# Patient Record
Sex: FEMALE | Race: WHITE | NOT HISPANIC OR LATINO | URBAN - METROPOLITAN AREA
[De-identification: names, ages, dates, MRNs, and addresses within clinical notes are randomized per-mention and may not be internally consistent; named-entity substitution may affect disease eponyms.]

---

## 2017-11-27 ENCOUNTER — EMERGENCY (EMERGENCY)
Facility: HOSPITAL | Age: 57
LOS: 0 days | Discharge: HOME | End: 2017-11-28
Attending: EMERGENCY MEDICINE
Payer: MEDICAID

## 2017-11-27 VITALS
OXYGEN SATURATION: 95 % | SYSTOLIC BLOOD PRESSURE: 164 MMHG | HEART RATE: 79 BPM | RESPIRATION RATE: 17 BRPM | DIASTOLIC BLOOD PRESSURE: 74 MMHG | TEMPERATURE: 99 F | WEIGHT: 270.07 LBS | HEIGHT: 66 IN

## 2017-11-27 DIAGNOSIS — K46.9 UNSPECIFIED ABDOMINAL HERNIA WITHOUT OBSTRUCTION OR GANGRENE: Chronic | ICD-10-CM

## 2017-11-27 DIAGNOSIS — M25.571 PAIN IN RIGHT ANKLE AND JOINTS OF RIGHT FOOT: ICD-10-CM

## 2017-11-27 DIAGNOSIS — E66.01 MORBID (SEVERE) OBESITY DUE TO EXCESS CALORIES: ICD-10-CM

## 2017-11-27 DIAGNOSIS — R60.9 EDEMA, UNSPECIFIED: ICD-10-CM

## 2017-11-27 DIAGNOSIS — E03.9 HYPOTHYROIDISM, UNSPECIFIED: ICD-10-CM

## 2017-11-27 DIAGNOSIS — Z98.891 HISTORY OF UTERINE SCAR FROM PREVIOUS SURGERY: Chronic | ICD-10-CM

## 2017-11-27 DIAGNOSIS — Z90.49 ACQUIRED ABSENCE OF OTHER SPECIFIED PARTS OF DIGESTIVE TRACT: Chronic | ICD-10-CM

## 2017-11-27 DIAGNOSIS — M10.9 GOUT, UNSPECIFIED: ICD-10-CM

## 2017-11-27 DIAGNOSIS — I10 ESSENTIAL (PRIMARY) HYPERTENSION: ICD-10-CM

## 2017-11-27 DIAGNOSIS — E11.9 TYPE 2 DIABETES MELLITUS WITHOUT COMPLICATIONS: ICD-10-CM

## 2017-11-27 LAB
ALBUMIN SERPL ELPH-MCNC: 2.9 G/DL — LOW (ref 3.3–5)
ALP SERPL-CCNC: 175 U/L — HIGH (ref 40–120)
ALT FLD-CCNC: 47 U/L — SIGNIFICANT CHANGE UP (ref 12–78)
ANION GAP SERPL CALC-SCNC: 8 MMOL/L — SIGNIFICANT CHANGE UP (ref 5–17)
APTT BLD: 32.4 SEC — SIGNIFICANT CHANGE UP (ref 27.5–37.4)
AST SERPL-CCNC: 87 U/L — HIGH (ref 15–37)
BASOPHILS # BLD AUTO: 0.1 K/UL — SIGNIFICANT CHANGE UP (ref 0–0.2)
BASOPHILS NFR BLD AUTO: 1.3 % — SIGNIFICANT CHANGE UP (ref 0–2)
BILIRUB SERPL-MCNC: 0.6 MG/DL — SIGNIFICANT CHANGE UP (ref 0.2–1.2)
BUN SERPL-MCNC: 42 MG/DL — HIGH (ref 7–23)
CALCIUM SERPL-MCNC: 8.5 MG/DL — SIGNIFICANT CHANGE UP (ref 8.5–10.1)
CHLORIDE SERPL-SCNC: 100 MMOL/L — SIGNIFICANT CHANGE UP (ref 96–108)
CO2 SERPL-SCNC: 25 MMOL/L — SIGNIFICANT CHANGE UP (ref 22–31)
CREAT SERPL-MCNC: 1.6 MG/DL — HIGH (ref 0.5–1.3)
EOSINOPHIL # BLD AUTO: 0.1 K/UL — SIGNIFICANT CHANGE UP (ref 0–0.5)
EOSINOPHIL NFR BLD AUTO: 1.3 % — SIGNIFICANT CHANGE UP (ref 0–6)
GLUCOSE SERPL-MCNC: 636 MG/DL — CRITICAL HIGH (ref 70–99)
HCT VFR BLD CALC: 36.5 % — SIGNIFICANT CHANGE UP (ref 34.5–45)
HGB BLD-MCNC: 11.8 G/DL — SIGNIFICANT CHANGE UP (ref 11.5–15.5)
INR BLD: 1.14 RATIO — SIGNIFICANT CHANGE UP (ref 0.88–1.16)
LYMPHOCYTES # BLD AUTO: 0.9 K/UL — LOW (ref 1–3.3)
LYMPHOCYTES # BLD AUTO: 21 % — SIGNIFICANT CHANGE UP (ref 13–44)
MCHC RBC-ENTMCNC: 31.1 PG — SIGNIFICANT CHANGE UP (ref 27–34)
MCHC RBC-ENTMCNC: 32.4 GM/DL — SIGNIFICANT CHANGE UP (ref 32–36)
MCV RBC AUTO: 95.8 FL — SIGNIFICANT CHANGE UP (ref 80–100)
MONOCYTES # BLD AUTO: 0.4 K/UL — SIGNIFICANT CHANGE UP (ref 0–0.9)
MONOCYTES NFR BLD AUTO: 8.9 % — SIGNIFICANT CHANGE UP (ref 2–14)
NEUTROPHILS # BLD AUTO: 2.9 K/UL — SIGNIFICANT CHANGE UP (ref 1.8–7.4)
NEUTROPHILS NFR BLD AUTO: 67.6 % — SIGNIFICANT CHANGE UP (ref 43–77)
PLATELET # BLD AUTO: 100 K/UL — LOW (ref 150–400)
POTASSIUM SERPL-MCNC: 4.8 MMOL/L — SIGNIFICANT CHANGE UP (ref 3.5–5.3)
POTASSIUM SERPL-SCNC: 4.8 MMOL/L — SIGNIFICANT CHANGE UP (ref 3.5–5.3)
PROT SERPL-MCNC: 6.9 GM/DL — SIGNIFICANT CHANGE UP (ref 6–8.3)
PROTHROM AB SERPL-ACNC: 12.5 SEC — SIGNIFICANT CHANGE UP (ref 9.8–12.7)
RBC # BLD: 3.81 M/UL — SIGNIFICANT CHANGE UP (ref 3.8–5.2)
RBC # FLD: 14.5 % — SIGNIFICANT CHANGE UP (ref 11–15)
SODIUM SERPL-SCNC: 133 MMOL/L — LOW (ref 135–145)
WBC # BLD: 4.3 K/UL — SIGNIFICANT CHANGE UP (ref 3.8–10.5)
WBC # FLD AUTO: 4.3 K/UL — SIGNIFICANT CHANGE UP (ref 3.8–10.5)

## 2017-11-27 PROCEDURE — 99285 EMERGENCY DEPT VISIT HI MDM: CPT

## 2017-11-27 PROCEDURE — 93971 EXTREMITY STUDY: CPT | Mod: 26,RT

## 2017-11-27 PROCEDURE — 73630 X-RAY EXAM OF FOOT: CPT | Mod: 26,RT

## 2017-11-27 RX ORDER — KETOROLAC TROMETHAMINE 30 MG/ML
30 SYRINGE (ML) INJECTION ONCE
Qty: 0 | Refills: 0 | Status: DISCONTINUED | OUTPATIENT
Start: 2017-11-27 | End: 2017-11-27

## 2017-11-27 RX ORDER — COLCHICINE 0.6 MG
1.2 TABLET ORAL ONCE
Qty: 0 | Refills: 0 | Status: COMPLETED | OUTPATIENT
Start: 2017-11-27 | End: 2017-11-27

## 2017-11-27 RX ORDER — LEVOTHYROXINE SODIUM 125 MCG
1 TABLET ORAL
Qty: 0 | Refills: 0 | COMMUNITY

## 2017-11-27 RX ORDER — SODIUM CHLORIDE 9 MG/ML
2000 INJECTION INTRAMUSCULAR; INTRAVENOUS; SUBCUTANEOUS ONCE
Qty: 0 | Refills: 0 | Status: COMPLETED | OUTPATIENT
Start: 2017-11-27 | End: 2017-11-27

## 2017-11-27 RX ORDER — ACETAMINOPHEN 500 MG
1000 TABLET ORAL ONCE
Qty: 0 | Refills: 0 | Status: COMPLETED | OUTPATIENT
Start: 2017-11-27 | End: 2017-11-27

## 2017-11-27 RX ADMIN — Medication 1.2 MILLIGRAM(S): at 23:26

## 2017-11-27 RX ADMIN — SODIUM CHLORIDE 1000 MILLILITER(S): 9 INJECTION INTRAMUSCULAR; INTRAVENOUS; SUBCUTANEOUS at 23:55

## 2017-11-27 RX ADMIN — Medication 1000 MILLIGRAM(S): at 23:23

## 2017-11-27 RX ADMIN — Medication 400 MILLIGRAM(S): at 23:15

## 2017-11-27 NOTE — ED PROVIDER NOTE - PHYSICAL EXAMINATION
Gen: Alert, c/o pain to R toes  Head: NC, AT, EOMI, normal lids/conjunctiva  ENT: normal hearing, patent oropharynx, MMM  Neck: supple, no tenderness/meningismus, FROM  Pulm: Bilateral clear BS, normal resp effort, no wheeze/stridor/retractions  CV: RRR, no M/R/G, +dist pulses  Abd: soft, NT/ND, +BS, no guarding/rebound tenderness, +obese  Mskel: no edema/erythema/cyanosis, +TTP over R 2nd-5th toes, cap <2sec, DP+2  Skin: no rash  Neuro: AAOx3, no sensory/motor deficits

## 2017-11-27 NOTE — ED PROVIDER NOTE - MEDICAL DECISION MAKING DETAILS
Pt w Pt improved in ED w meds given, pain resolved.  Pt w elevated glucose, no anion gap.  Pt improved w IVF  & insulin, states she would like to go home and use her own insulin for further management.   Discussed results and outcome of testing with the patient, given copy as well.  Patient advised to please follow up with their primary care doctor within the next 24 hours and return to the Emergency Department for worsening symptoms or any other concerns.  Patient advised that their doctor may call  to follow up on the specific results of the tests performed today in the emergency department.

## 2017-11-27 NOTE — ED PROVIDER NOTE - OBJECTIVE STATEMENT
Pertinent PMH/PSH/FHx/SHx and Review of Systems contained within:    58yo F w PMH of HTN, DM, hypothyroidism, gout, water retention presents to ED c/o pain to RLE.  Pt states for about 1mo she noted pain in RLE from knee to calf, then for the last few days noted incr acute pain in her toes.  Pt states she did stub her toes few days prior to onset of pain. Pertinent PMH/PSH/FHx/SHx and Review of Systems contained within:    56yo F w PMH of HTN, DM, hypothyroidism, gout, water retention presents to ED c/o pain to RLE.  Pt states for about 1mo she noted pain in RLE from knee to calf, then for the last few days noted incr acute pain in her toes.  Pt states she did stub her toes few days prior to onset of pain.  Pt also admits she did not take her insulin today due to pain.  Denies fever, chills, CP, SOB, vomiting, diarrhea.    No fever/chills, No photophobia/eye pain/changes in vision, No ear pain/sore throat/dysphagia, No chest pain/palpitations, no SOB/cough/wheeze/stridor, No abdominal pain, No N/V/D, no dysuria/frequency/discharge, No neck/back pain, no rash, no changes in neurological status/function.

## 2017-11-27 NOTE — ED PROVIDER NOTE - PSH
Abdominal hernia  x 3 with abdominal mesh  H/O:   x3  History of appendectomy    History of cholecystectomy

## 2017-11-27 NOTE — ED ADULT NURSE NOTE - OBJECTIVE STATEMENT
Patient reports " toe pain for 1 month, severe tonight. All toes on my right foot. My knee also hurts." + pulses. Denies injury or trauma.

## 2017-11-28 VITALS
SYSTOLIC BLOOD PRESSURE: 148 MMHG | OXYGEN SATURATION: 97 % | RESPIRATION RATE: 17 BRPM | HEART RATE: 64 BPM | DIASTOLIC BLOOD PRESSURE: 72 MMHG | TEMPERATURE: 98 F

## 2017-11-28 LAB
GLUCOSE BLDC GLUCOMTR-MCNC: 506 MG/DL — CRITICAL HIGH (ref 70–99)
GLUCOSE BLDC GLUCOMTR-MCNC: 509 MG/DL — CRITICAL HIGH (ref 70–99)
GLUCOSE BLDC GLUCOMTR-MCNC: 514 MG/DL — CRITICAL HIGH (ref 70–99)
GLUCOSE BLDC GLUCOMTR-MCNC: 517 MG/DL — CRITICAL HIGH (ref 70–99)
URATE SERPL-MCNC: 7.3 MG/DL — HIGH (ref 2.5–7)

## 2017-11-28 RX ORDER — COLCHICINE 0.6 MG
0.6 TABLET ORAL ONCE
Qty: 0 | Refills: 0 | Status: COMPLETED | OUTPATIENT
Start: 2017-11-28 | End: 2017-11-28

## 2017-11-28 RX ORDER — INSULIN HUMAN 100 [IU]/ML
10 INJECTION, SOLUTION SUBCUTANEOUS ONCE
Qty: 0 | Refills: 0 | Status: COMPLETED | OUTPATIENT
Start: 2017-11-28 | End: 2017-11-28

## 2017-11-28 RX ADMIN — Medication 0.6 MILLIGRAM(S): at 01:36

## 2017-11-28 RX ADMIN — INSULIN HUMAN 10 UNIT(S): 100 INJECTION, SOLUTION SUBCUTANEOUS at 01:37

## 2018-04-15 PROBLEM — Z00.00 ENCOUNTER FOR PREVENTIVE HEALTH EXAMINATION: Status: ACTIVE | Noted: 2018-04-15

## 2018-05-15 ENCOUNTER — APPOINTMENT (OUTPATIENT)
Dept: OPHTHALMOLOGY | Facility: CLINIC | Age: 58
End: 2018-05-15

## 2023-12-07 ENCOUNTER — EMERGENCY (EMERGENCY)
Facility: HOSPITAL | Age: 63
LOS: 0 days | Discharge: HOME | End: 2023-12-08
Attending: EMERGENCY MEDICINE
Payer: MEDICAID

## 2023-12-07 VITALS
HEIGHT: 66.5 IN | TEMPERATURE: 98 F | DIASTOLIC BLOOD PRESSURE: 78 MMHG | WEIGHT: 231.93 LBS | HEART RATE: 69 BPM | OXYGEN SATURATION: 99 % | RESPIRATION RATE: 19 BRPM | SYSTOLIC BLOOD PRESSURE: 135 MMHG

## 2023-12-07 DIAGNOSIS — Z90.49 ACQUIRED ABSENCE OF OTHER SPECIFIED PARTS OF DIGESTIVE TRACT: Chronic | ICD-10-CM

## 2023-12-07 DIAGNOSIS — K46.9 UNSPECIFIED ABDOMINAL HERNIA WITHOUT OBSTRUCTION OR GANGRENE: Chronic | ICD-10-CM

## 2023-12-07 DIAGNOSIS — Z98.891 HISTORY OF UTERINE SCAR FROM PREVIOUS SURGERY: Chronic | ICD-10-CM

## 2023-12-07 LAB
ALBUMIN SERPL ELPH-MCNC: 3.4 G/DL — SIGNIFICANT CHANGE UP (ref 3.3–5)
ALBUMIN SERPL ELPH-MCNC: 3.4 G/DL — SIGNIFICANT CHANGE UP (ref 3.3–5)
ALP SERPL-CCNC: 135 U/L — HIGH (ref 40–120)
ALP SERPL-CCNC: 135 U/L — HIGH (ref 40–120)
ALT FLD-CCNC: 36 U/L — SIGNIFICANT CHANGE UP (ref 12–78)
ALT FLD-CCNC: 36 U/L — SIGNIFICANT CHANGE UP (ref 12–78)
ANION GAP SERPL CALC-SCNC: 14 MMOL/L — SIGNIFICANT CHANGE UP (ref 5–17)
ANION GAP SERPL CALC-SCNC: 14 MMOL/L — SIGNIFICANT CHANGE UP (ref 5–17)
AST SERPL-CCNC: 33 U/L — SIGNIFICANT CHANGE UP (ref 15–37)
AST SERPL-CCNC: 33 U/L — SIGNIFICANT CHANGE UP (ref 15–37)
BASOPHILS # BLD AUTO: 0.03 K/UL — SIGNIFICANT CHANGE UP (ref 0–0.2)
BASOPHILS # BLD AUTO: 0.03 K/UL — SIGNIFICANT CHANGE UP (ref 0–0.2)
BASOPHILS NFR BLD AUTO: 0.4 % — SIGNIFICANT CHANGE UP (ref 0–2)
BASOPHILS NFR BLD AUTO: 0.4 % — SIGNIFICANT CHANGE UP (ref 0–2)
BILIRUB SERPL-MCNC: 0.8 MG/DL — SIGNIFICANT CHANGE UP (ref 0.2–1.2)
BILIRUB SERPL-MCNC: 0.8 MG/DL — SIGNIFICANT CHANGE UP (ref 0.2–1.2)
BUN SERPL-MCNC: 141 MG/DL — HIGH (ref 7–23)
BUN SERPL-MCNC: 141 MG/DL — HIGH (ref 7–23)
CALCIUM SERPL-MCNC: 9.8 MG/DL — SIGNIFICANT CHANGE UP (ref 8.5–10.1)
CALCIUM SERPL-MCNC: 9.8 MG/DL — SIGNIFICANT CHANGE UP (ref 8.5–10.1)
CHLORIDE SERPL-SCNC: 94 MMOL/L — LOW (ref 96–108)
CHLORIDE SERPL-SCNC: 94 MMOL/L — LOW (ref 96–108)
CO2 SERPL-SCNC: 27 MMOL/L — SIGNIFICANT CHANGE UP (ref 22–31)
CO2 SERPL-SCNC: 27 MMOL/L — SIGNIFICANT CHANGE UP (ref 22–31)
CREAT SERPL-MCNC: 3.88 MG/DL — HIGH (ref 0.5–1.3)
CREAT SERPL-MCNC: 3.88 MG/DL — HIGH (ref 0.5–1.3)
EGFR: 12 ML/MIN/1.73M2 — LOW
EGFR: 12 ML/MIN/1.73M2 — LOW
EOSINOPHIL # BLD AUTO: 0.12 K/UL — SIGNIFICANT CHANGE UP (ref 0–0.5)
EOSINOPHIL # BLD AUTO: 0.12 K/UL — SIGNIFICANT CHANGE UP (ref 0–0.5)
EOSINOPHIL NFR BLD AUTO: 1.6 % — SIGNIFICANT CHANGE UP (ref 0–6)
EOSINOPHIL NFR BLD AUTO: 1.6 % — SIGNIFICANT CHANGE UP (ref 0–6)
GLUCOSE SERPL-MCNC: 226 MG/DL — HIGH (ref 70–99)
GLUCOSE SERPL-MCNC: 226 MG/DL — HIGH (ref 70–99)
HCT VFR BLD CALC: 29.6 % — LOW (ref 34.5–45)
HCT VFR BLD CALC: 29.6 % — LOW (ref 34.5–45)
HGB BLD-MCNC: 10 G/DL — LOW (ref 11.5–15.5)
HGB BLD-MCNC: 10 G/DL — LOW (ref 11.5–15.5)
IMM GRANULOCYTES NFR BLD AUTO: 0.4 % — SIGNIFICANT CHANGE UP (ref 0–0.9)
IMM GRANULOCYTES NFR BLD AUTO: 0.4 % — SIGNIFICANT CHANGE UP (ref 0–0.9)
LIDOCAIN IGE QN: 112 U/L — HIGH (ref 13–75)
LIDOCAIN IGE QN: 112 U/L — HIGH (ref 13–75)
LYMPHOCYTES # BLD AUTO: 0.81 K/UL — LOW (ref 1–3.3)
LYMPHOCYTES # BLD AUTO: 0.81 K/UL — LOW (ref 1–3.3)
LYMPHOCYTES # BLD AUTO: 11.1 % — LOW (ref 13–44)
LYMPHOCYTES # BLD AUTO: 11.1 % — LOW (ref 13–44)
MAGNESIUM SERPL-MCNC: 3.4 MG/DL — HIGH (ref 1.6–2.6)
MAGNESIUM SERPL-MCNC: 3.4 MG/DL — HIGH (ref 1.6–2.6)
MCHC RBC-ENTMCNC: 30.9 PG — SIGNIFICANT CHANGE UP (ref 27–34)
MCHC RBC-ENTMCNC: 30.9 PG — SIGNIFICANT CHANGE UP (ref 27–34)
MCHC RBC-ENTMCNC: 33.8 G/DL — SIGNIFICANT CHANGE UP (ref 32–36)
MCHC RBC-ENTMCNC: 33.8 G/DL — SIGNIFICANT CHANGE UP (ref 32–36)
MCV RBC AUTO: 91.4 FL — SIGNIFICANT CHANGE UP (ref 80–100)
MCV RBC AUTO: 91.4 FL — SIGNIFICANT CHANGE UP (ref 80–100)
MONOCYTES # BLD AUTO: 0.55 K/UL — SIGNIFICANT CHANGE UP (ref 0–0.9)
MONOCYTES # BLD AUTO: 0.55 K/UL — SIGNIFICANT CHANGE UP (ref 0–0.9)
MONOCYTES NFR BLD AUTO: 7.6 % — SIGNIFICANT CHANGE UP (ref 2–14)
MONOCYTES NFR BLD AUTO: 7.6 % — SIGNIFICANT CHANGE UP (ref 2–14)
NEUTROPHILS # BLD AUTO: 5.74 K/UL — SIGNIFICANT CHANGE UP (ref 1.8–7.4)
NEUTROPHILS # BLD AUTO: 5.74 K/UL — SIGNIFICANT CHANGE UP (ref 1.8–7.4)
NEUTROPHILS NFR BLD AUTO: 78.9 % — HIGH (ref 43–77)
NEUTROPHILS NFR BLD AUTO: 78.9 % — HIGH (ref 43–77)
NRBC # BLD: 0 /100 WBCS — SIGNIFICANT CHANGE UP (ref 0–0)
NRBC # BLD: 0 /100 WBCS — SIGNIFICANT CHANGE UP (ref 0–0)
PHOSPHATE SERPL-MCNC: 6.6 MG/DL — HIGH (ref 2.5–4.5)
PHOSPHATE SERPL-MCNC: 6.6 MG/DL — HIGH (ref 2.5–4.5)
PLATELET # BLD AUTO: 86 K/UL — LOW (ref 150–400)
PLATELET # BLD AUTO: 86 K/UL — LOW (ref 150–400)
POTASSIUM SERPL-MCNC: 3.7 MMOL/L — SIGNIFICANT CHANGE UP (ref 3.5–5.3)
POTASSIUM SERPL-MCNC: 3.7 MMOL/L — SIGNIFICANT CHANGE UP (ref 3.5–5.3)
POTASSIUM SERPL-SCNC: 3.7 MMOL/L — SIGNIFICANT CHANGE UP (ref 3.5–5.3)
POTASSIUM SERPL-SCNC: 3.7 MMOL/L — SIGNIFICANT CHANGE UP (ref 3.5–5.3)
PROT SERPL-MCNC: 7.5 GM/DL — SIGNIFICANT CHANGE UP (ref 6–8.3)
PROT SERPL-MCNC: 7.5 GM/DL — SIGNIFICANT CHANGE UP (ref 6–8.3)
RBC # BLD: 3.24 M/UL — LOW (ref 3.8–5.2)
RBC # BLD: 3.24 M/UL — LOW (ref 3.8–5.2)
RBC # FLD: 15.5 % — HIGH (ref 10.3–14.5)
RBC # FLD: 15.5 % — HIGH (ref 10.3–14.5)
SODIUM SERPL-SCNC: 135 MMOL/L — SIGNIFICANT CHANGE UP (ref 135–145)
SODIUM SERPL-SCNC: 135 MMOL/L — SIGNIFICANT CHANGE UP (ref 135–145)
WBC # BLD: 7.28 K/UL — SIGNIFICANT CHANGE UP (ref 3.8–10.5)
WBC # BLD: 7.28 K/UL — SIGNIFICANT CHANGE UP (ref 3.8–10.5)
WBC # FLD AUTO: 7.28 K/UL — SIGNIFICANT CHANGE UP (ref 3.8–10.5)
WBC # FLD AUTO: 7.28 K/UL — SIGNIFICANT CHANGE UP (ref 3.8–10.5)

## 2023-12-07 PROCEDURE — 99284 EMERGENCY DEPT VISIT MOD MDM: CPT

## 2023-12-07 PROCEDURE — 93010 ELECTROCARDIOGRAM REPORT: CPT

## 2023-12-07 RX ORDER — ALLOPURINOL 300 MG
1 TABLET ORAL
Qty: 0 | Refills: 0 | DISCHARGE

## 2023-12-07 RX ORDER — INSULIN ASPART 100 [IU]/ML
0 INJECTION, SOLUTION SUBCUTANEOUS
Qty: 0 | Refills: 0 | DISCHARGE

## 2023-12-07 RX ORDER — INSULIN GLARGINE 100 [IU]/ML
0 INJECTION, SOLUTION SUBCUTANEOUS
Qty: 0 | Refills: 0 | DISCHARGE

## 2023-12-07 RX ORDER — GABAPENTIN 400 MG/1
1 CAPSULE ORAL
Qty: 0 | Refills: 0 | DISCHARGE

## 2023-12-07 RX ORDER — ISOSORBIDE MONONITRATE 60 MG/1
1 TABLET, EXTENDED RELEASE ORAL
Qty: 0 | Refills: 0 | DISCHARGE

## 2023-12-07 NOTE — ED ADULT NURSE NOTE - NSFALLRISKINTERV_ED_ALL_ED
Assistance OOB with selected safe patient handling equipment if applicable/Assistance with ambulation/Communicate fall risk and risk factors to all staff, patient, and family/Monitor gait and stability/Provide patient with walking aids/Provide visual cue: yellow wristband, yellow gown, etc/Reinforce activity limits and safety measures with patient and family/Call bell, personal items and telephone in reach/Instruct patient to call for assistance before getting out of bed/chair/stretcher/Non-slip footwear applied when patient is off stretcher/[place] to call system/Physically safe environment - no spills, clutter or unnecessary equipment/Purposeful Proactive Rounding/Room/bathroom lighting operational, light cord in reach Assistance OOB with selected safe patient handling equipment if applicable/Assistance with ambulation/Communicate fall risk and risk factors to all staff, patient, and family/Monitor gait and stability/Provide patient with walking aids/Provide visual cue: yellow wristband, yellow gown, etc/Reinforce activity limits and safety measures with patient and family/Call bell, personal items and telephone in reach/Instruct patient to call for assistance before getting out of bed/chair/stretcher/Non-slip footwear applied when patient is off stretcher/[place] to call system/Physically safe environment - no spills, clutter or unnecessary equipment/Purposeful Proactive Rounding/Room/bathroom lighting operational, light cord in reach

## 2023-12-07 NOTE — ED ADULT TRIAGE NOTE - CHIEF COMPLAINT QUOTE
Pt sent in by endocrinologist for low potassium. pt also c/o generalized ABD pain and nausea x 1 week. h/o DM, htn,

## 2023-12-07 NOTE — ED ADULT NURSE NOTE - OBJECTIVE STATEMENT
abd pain for 1 week  ozempic started 1 week ago  nausea but no vomiting  potassium low from kidney doctor  denies chest pain, sob    PMH htn, dm, leukemia Pt presents a&ox4 c/o "My stomach has been hurting on and off for one week. I started ozempic a week ago, I wonder if its related." Also c/o nausea but no vomiting. Denies diarrhea. She also adds that "My doctor had told me that my potassium was a little low." Pt denies chest pain, sob. Respirations are easy, even and unlabored on room air. Skin is warm and dry. PMH htn, dm,

## 2023-12-08 VITALS
DIASTOLIC BLOOD PRESSURE: 76 MMHG | TEMPERATURE: 98 F | OXYGEN SATURATION: 100 % | SYSTOLIC BLOOD PRESSURE: 116 MMHG | RESPIRATION RATE: 18 BRPM

## 2023-12-08 DIAGNOSIS — E11.22 TYPE 2 DIABETES MELLITUS WITH DIABETIC CHRONIC KIDNEY DISEASE: ICD-10-CM

## 2023-12-08 DIAGNOSIS — Z79.4 LONG TERM (CURRENT) USE OF INSULIN: ICD-10-CM

## 2023-12-08 DIAGNOSIS — R63.0 ANOREXIA: ICD-10-CM

## 2023-12-08 DIAGNOSIS — Z90.49 ACQUIRED ABSENCE OF OTHER SPECIFIED PARTS OF DIGESTIVE TRACT: ICD-10-CM

## 2023-12-08 DIAGNOSIS — Z88.0 ALLERGY STATUS TO PENICILLIN: ICD-10-CM

## 2023-12-08 DIAGNOSIS — R11.0 NAUSEA: ICD-10-CM

## 2023-12-08 DIAGNOSIS — R10.9 UNSPECIFIED ABDOMINAL PAIN: ICD-10-CM

## 2023-12-08 DIAGNOSIS — N18.9 CHRONIC KIDNEY DISEASE, UNSPECIFIED: ICD-10-CM

## 2023-12-08 DIAGNOSIS — I12.9 HYPERTENSIVE CHRONIC KIDNEY DISEASE WITH STAGE 1 THROUGH STAGE 4 CHRONIC KIDNEY DISEASE, OR UNSPECIFIED CHRONIC KIDNEY DISEASE: ICD-10-CM

## 2023-12-08 PROBLEM — I10 ESSENTIAL (PRIMARY) HYPERTENSION: Chronic | Status: ACTIVE | Noted: 2017-11-27

## 2023-12-08 PROBLEM — E66.01 MORBID (SEVERE) OBESITY DUE TO EXCESS CALORIES: Chronic | Status: ACTIVE | Noted: 2017-11-27

## 2023-12-08 PROBLEM — E03.9 HYPOTHYROIDISM, UNSPECIFIED: Chronic | Status: ACTIVE | Noted: 2017-11-27

## 2023-12-08 PROBLEM — E11.9 TYPE 2 DIABETES MELLITUS WITHOUT COMPLICATIONS: Chronic | Status: ACTIVE | Noted: 2017-11-27

## 2023-12-08 PROBLEM — R60.9 EDEMA, UNSPECIFIED: Chronic | Status: ACTIVE | Noted: 2017-11-27

## 2023-12-08 NOTE — ED PROVIDER NOTE - PATIENT PORTAL LINK FT
You can access the FollowMyHealth Patient Portal offered by [facility] by registering at the following website: [email or web address]. By joining [employer]’s FollowMyHealth portal, you will also be able to view your health information using other applications (apps) compatible with our system. You can access the FollowMyHealth Patient Portal offered by [facility] by registering at the following website: [email or web address]. By joining [employer]’s FollowMyHealth portal, you will also be able to view your health information using other applications (apps) compatible with our system.

## 2023-12-08 NOTE — ED PROVIDER NOTE - OBJECTIVE STATEMENT
She is status post cholecystectomy and appendectomy. PMH CKD, HTN, DM. Patient with an extensive past medical history presents to the emergency department for 1 week of abdominal pain she states that she started Ozempic and since then has been feeling unwell.  She states intermittent nausea as well as decreased appetite.  She states that she was also called by her kidney doctor and told that her potassium was low and told to come into the emergency department.  She states she does not not make much urine at baseline.  She denies any chest pain or shortness of breath patient she states that her leg swelling is actually better than normal.  Did obtain basic labs.  Patient does have MyChart access and her creatinine is similar to what it was 2 days ago( and last month as well.  Her BUN is also similar.  Her platelet count is always low she states.  Lipase is mildly elevated she states that she is hungry and would like a turkey sandwich.  She has follow-up with her doctors on Monday.  She plans to call her nephrologist tomorrow to go over all results.  Patient is adamant that she does not wish to stay in the hospital and will follow-up as outpatient for all labs.

## 2023-12-08 NOTE — ED PROVIDER NOTE - NSFOLLOWUPINSTRUCTIONS_ED_ALL_ED_FT
You were seen in the emergency department for abdominal pain.  Please keep a bland diet.  Do not take another dose of Ozempic until following up with your endocrinologist.  Please call your doctor tomorrow to go over all of your laboratory findings.  If anything changes such as chest pain, shortness of breath, altered mental status, even further decreased urination or any other concerning signs or symptoms please return to the emergency department immediately.  Please make sure to follow-up with your doctors for your appointments on Monday.

## 2023-12-08 NOTE — ED PROVIDER NOTE - CLINICAL SUMMARY MEDICAL DECISION MAKING FREE TEXT BOX
Pt with abdominal pain after initiating Ozempic will check LFT and lipase. Pt with intermittent nausea none currently. She has had appy and tammy suspicion for other intraabdominal pathology low.     Will check basic labs for any changes and compare to lab work on mychart which she has on her phone.

## 2023-12-08 NOTE — ED PROVIDER NOTE - NSICDXPASTSURGICALHX_GEN_ALL_CORE_FT
PAST SURGICAL HISTORY:  Abdominal hernia x 3 with abdominal mesh    H/O:  x3    History of appendectomy     History of cholecystectomy

## 2023-12-08 NOTE — ED PROVIDER NOTE - NSICDXPASTMEDICALHX_GEN_ALL_CORE_FT
PAST MEDICAL HISTORY:  Diabetes     HTN (hypertension)     Hypothyroid     Morbid obesity     Water retention

## 2023-12-08 NOTE — ED PROVIDER NOTE - PHYSICAL EXAMINATION
General: Well appearing, well nourished, in no distress  Head: Normocephalic, atraumatic  Eyes: Conjunctiva clear, sclera non-icteric  Mouth: Mucous membranes moist, no mucosal lesions.  Neck: Supple  Heart: Regular rate and rhythm, no murmur or gallop  Lungs: Clear to auscultation  Abdomen: Bowel sounds present, soft, no tenderness, non distended, no organomegaly, masses  Back: Spine normal without deformity or tenderness, no CVA tenderness  Extremities: No amputations or deformities, cyanosis, trace LE edema  Musculoskeletal: No crepitation, defects or decreased range of motion, strength intact throughout, pulses intact  Neurologic: No gross deficits normal gait w walker  Psychiatric: Oriented X3, normal mood and affect  Skin: Warm,dry. Good turgor, no rash, unusual bruising

## 2024-03-19 NOTE — ED ADULT NURSE NOTE - ATTEMPT TO OOB
Serum sodium decreased at 131 today  Consider a SIADH type picture in the setting of uncontrolled pain coupled with possible solute loss from decreased oral intake  Trial fluid restriction as paradoxic decrease noted on IV fluids    no

## 2024-07-12 ENCOUNTER — INPATIENT (INPATIENT)
Facility: HOSPITAL | Age: 64
LOS: 1 days | Discharge: HOME | End: 2024-07-14
Attending: HOSPITALIST | Admitting: HOSPITALIST
Payer: MEDICAID

## 2024-07-12 VITALS
RESPIRATION RATE: 18 BRPM | DIASTOLIC BLOOD PRESSURE: 64 MMHG | WEIGHT: 255.07 LBS | TEMPERATURE: 98 F | OXYGEN SATURATION: 100 % | HEIGHT: 66 IN | HEART RATE: 66 BPM | SYSTOLIC BLOOD PRESSURE: 108 MMHG

## 2024-07-12 DIAGNOSIS — Z90.49 ACQUIRED ABSENCE OF OTHER SPECIFIED PARTS OF DIGESTIVE TRACT: Chronic | ICD-10-CM

## 2024-07-12 DIAGNOSIS — Z98.891 HISTORY OF UTERINE SCAR FROM PREVIOUS SURGERY: Chronic | ICD-10-CM

## 2024-07-12 DIAGNOSIS — K46.9 UNSPECIFIED ABDOMINAL HERNIA WITHOUT OBSTRUCTION OR GANGRENE: Chronic | ICD-10-CM

## 2024-07-12 LAB
ALBUMIN SERPL ELPH-MCNC: 2.8 G/DL — LOW (ref 3.3–5)
ALP SERPL-CCNC: 152 U/L — HIGH (ref 40–120)
ALT FLD-CCNC: 26 U/L — SIGNIFICANT CHANGE UP (ref 12–78)
ANION GAP SERPL CALC-SCNC: 5 MMOL/L — SIGNIFICANT CHANGE UP (ref 5–17)
AST SERPL-CCNC: 50 U/L — HIGH (ref 15–37)
BASOPHILS # BLD AUTO: 0.02 K/UL — SIGNIFICANT CHANGE UP (ref 0–0.2)
BASOPHILS NFR BLD AUTO: 0.7 % — SIGNIFICANT CHANGE UP (ref 0–2)
BILIRUB SERPL-MCNC: 1 MG/DL — SIGNIFICANT CHANGE UP (ref 0.2–1.2)
BLD GP AB SCN SERPL QL: SIGNIFICANT CHANGE UP
BUN SERPL-MCNC: 30 MG/DL — HIGH (ref 7–23)
CALCIUM SERPL-MCNC: 9.7 MG/DL — SIGNIFICANT CHANGE UP (ref 8.5–10.1)
CHLORIDE SERPL-SCNC: 107 MMOL/L — SIGNIFICANT CHANGE UP (ref 96–108)
CO2 SERPL-SCNC: 28 MMOL/L — SIGNIFICANT CHANGE UP (ref 22–31)
CREAT SERPL-MCNC: 3.2 MG/DL — HIGH (ref 0.5–1.3)
EGFR: 15 ML/MIN/1.73M2 — LOW
EOSINOPHIL # BLD AUTO: 0.09 K/UL — SIGNIFICANT CHANGE UP (ref 0–0.5)
EOSINOPHIL NFR BLD AUTO: 3 % — SIGNIFICANT CHANGE UP (ref 0–6)
GLUCOSE SERPL-MCNC: 237 MG/DL — HIGH (ref 70–99)
HCT VFR BLD CALC: 30.7 % — LOW (ref 34.5–45)
HGB BLD-MCNC: 10.1 G/DL — LOW (ref 11.5–15.5)
IMM GRANULOCYTES NFR BLD AUTO: 0.3 % — SIGNIFICANT CHANGE UP (ref 0–0.9)
LIDOCAIN IGE QN: 58 U/L — SIGNIFICANT CHANGE UP (ref 13–75)
LYMPHOCYTES # BLD AUTO: 0.68 K/UL — LOW (ref 1–3.3)
LYMPHOCYTES # BLD AUTO: 22.4 % — SIGNIFICANT CHANGE UP (ref 13–44)
MCHC RBC-ENTMCNC: 32.9 G/DL — SIGNIFICANT CHANGE UP (ref 32–36)
MCHC RBC-ENTMCNC: 34.1 PG — HIGH (ref 27–34)
MCV RBC AUTO: 103.7 FL — HIGH (ref 80–100)
MONOCYTES # BLD AUTO: 0.32 K/UL — SIGNIFICANT CHANGE UP (ref 0–0.9)
MONOCYTES NFR BLD AUTO: 10.5 % — SIGNIFICANT CHANGE UP (ref 2–14)
NEUTROPHILS # BLD AUTO: 1.92 K/UL — SIGNIFICANT CHANGE UP (ref 1.8–7.4)
NEUTROPHILS NFR BLD AUTO: 63.1 % — SIGNIFICANT CHANGE UP (ref 43–77)
NRBC # BLD: 0 /100 WBCS — SIGNIFICANT CHANGE UP (ref 0–0)
PLATELET # BLD AUTO: 42 K/UL — LOW (ref 150–400)
POTASSIUM SERPL-MCNC: 4.3 MMOL/L — SIGNIFICANT CHANGE UP (ref 3.5–5.3)
POTASSIUM SERPL-SCNC: 4.3 MMOL/L — SIGNIFICANT CHANGE UP (ref 3.5–5.3)
PROT SERPL-MCNC: 6.4 GM/DL — SIGNIFICANT CHANGE UP (ref 6–8.3)
RBC # BLD: 2.96 M/UL — LOW (ref 3.8–5.2)
RBC # FLD: 14.6 % — HIGH (ref 10.3–14.5)
SODIUM SERPL-SCNC: 140 MMOL/L — SIGNIFICANT CHANGE UP (ref 135–145)
WBC # BLD: 3.04 K/UL — LOW (ref 3.8–10.5)
WBC # FLD AUTO: 3.04 K/UL — LOW (ref 3.8–10.5)

## 2024-07-12 PROCEDURE — 99285 EMERGENCY DEPT VISIT HI MDM: CPT

## 2024-07-12 PROCEDURE — 74177 CT ABD & PELVIS W/CONTRAST: CPT | Mod: 26,MC

## 2024-07-12 PROCEDURE — 99223 1ST HOSP IP/OBS HIGH 75: CPT

## 2024-07-12 PROCEDURE — 71275 CT ANGIOGRAPHY CHEST: CPT | Mod: 26,MC

## 2024-07-12 PROCEDURE — 93010 ELECTROCARDIOGRAM REPORT: CPT

## 2024-07-12 RX ORDER — ACETAMINOPHEN 325 MG
650 TABLET ORAL EVERY 6 HOURS
Refills: 0 | Status: DISCONTINUED | OUTPATIENT
Start: 2024-07-12 | End: 2024-07-14

## 2024-07-12 RX ORDER — ONDANSETRON HYDROCHLORIDE 2 MG/ML
4 INJECTION INTRAMUSCULAR; INTRAVENOUS EVERY 8 HOURS
Refills: 0 | Status: DISCONTINUED | OUTPATIENT
Start: 2024-07-12 | End: 2024-07-14

## 2024-07-12 RX ORDER — DEXTROSE 30 % IN WATER 30 %
15 VIAL (ML) INTRAVENOUS ONCE
Refills: 0 | Status: DISCONTINUED | OUTPATIENT
Start: 2024-07-12 | End: 2024-07-14

## 2024-07-12 RX ORDER — MAGNESIUM, ALUMINUM HYDROXIDE 400-400
30 TABLET,CHEWABLE ORAL EVERY 4 HOURS
Refills: 0 | Status: DISCONTINUED | OUTPATIENT
Start: 2024-07-12 | End: 2024-07-14

## 2024-07-12 RX ORDER — DEXTROSE MONOHYDRATE AND SODIUM CHLORIDE 5; .3 G/100ML; G/100ML
1000 INJECTION, SOLUTION INTRAVENOUS
Refills: 0 | Status: DISCONTINUED | OUTPATIENT
Start: 2024-07-12 | End: 2024-07-14

## 2024-07-12 RX ORDER — PANTOPRAZOLE SODIUM 40 MG/10ML
80 INJECTION, POWDER, FOR SOLUTION INTRAVENOUS ONCE
Refills: 0 | Status: DISCONTINUED | OUTPATIENT
Start: 2024-07-12 | End: 2024-07-13

## 2024-07-12 RX ORDER — GLUCAGON HYDROCHLORIDE 1 MG/ML
1 INJECTION, POWDER, FOR SOLUTION INTRAMUSCULAR; INTRAVENOUS; SUBCUTANEOUS ONCE
Refills: 0 | Status: DISCONTINUED | OUTPATIENT
Start: 2024-07-12 | End: 2024-07-14

## 2024-07-12 RX ORDER — INSULIN LISPRO 100 [IU]/ML
INJECTION, SOLUTION SUBCUTANEOUS
Refills: 0 | Status: DISCONTINUED | OUTPATIENT
Start: 2024-07-12 | End: 2024-07-14

## 2024-07-12 RX ORDER — DEXTROSE 30 % IN WATER 30 %
25 VIAL (ML) INTRAVENOUS ONCE
Refills: 0 | Status: DISCONTINUED | OUTPATIENT
Start: 2024-07-12 | End: 2024-07-14

## 2024-07-12 NOTE — ED PROVIDER NOTE - OBJECTIVE STATEMENT
Jacqueline PETERSON: 63-year-old female, history of end-stage renal disease gets dialysis Tuesday Thursday Saturday, last had on Thursday, history of possible esophageal perforation in the past cirrhosis leukemia diabetes presents with a chief complaint of 3 days of coughing up blood, she notes mild diffuse abdominal pain no changes in stools, no urinary complaints, no prior history of DVT or PE, notes when she wakes morning she sees blood in the back of her throat and coughs it up, does not have any chest pain or trouble breathing, no nausea vomiting no fever.

## 2024-07-12 NOTE — ED ADULT NURSE NOTE - DRUG PRE-SCREENING (DAST -1)
Statement Selected Intermediate Repair Preamble Text (Leave Blank If You Do Not Want): Undermining was performed with blunt dissection.

## 2024-07-12 NOTE — ED PROVIDER NOTE - PHYSICAL EXAMINATION
Jacqueline PETERSON:  VITALS: Initial triage and subsequent vitals have been reviewed by me.  GEN APPEARANCE: Alert, cooperative. Non-toxic appearing. Well appearing. NAD.  HEAD: Atraumatic, normocephalic   EYES: PERRLa, EOMI, vision grossly intact.   EARS: Gross hearing intact.   NOSE: No nasal discharge, no external evidence of epistaxis.   THROAT: MMM. Oral cavity and pharynx normal. No inflammation, no swelling, no exudate, no oral lesions.  NECK: Supple  CV: RRR, S1S2, no c/r/m/g. No cyanosis. Extremities warm, well perfused. Cap refill <2 seconds. No bruits.   LUNGS: CTAB. No wheezing. No rales. No rhonchi. No diminished breath sounds.   ABDOMEN: Soft, NTND. No guarding or rebound. No masses.   MSK/EXT: Spine appears normal, no spine point tenderness. No CVA ttp. Normal muscular development. Pelvis stable. No obvious joint or bony deformity, no peripheral edema.   NEURO: Alert, follows commands. Weight bearing normal. Speech normal. Sensation and motor normal x4 extremities.   SKIN: Normal color for race, warm, dry and intact. No evidence of rash.R chest wall Shiley in place   PSYCH: Normal mood and affect.

## 2024-07-12 NOTE — ED ADULT NURSE NOTE - OBJECTIVE STATEMENT
63 yr old female with PMH of hypertension, diabetes, asthma, high cholesterol, CHF, kidney failure on dialysis on T/Th/Sat. Pt comes in with c/o abdominal and throat pain starting last night. Pt states bloody sputum. Pt reports two episodes of diarrhea overnight. Pt denies cough, dizziness. SOB, chest pain, blurred vision, N/V.

## 2024-07-12 NOTE — ED PROVIDER NOTE - CLINICAL SUMMARY MEDICAL DECISION MAKING FREE TEXT BOX
Jacqueline PETERSON: Exam vitals are stable nontoxic-appearing with physical exam as above DDx unclear etiology of patient's coughing up blood, could be possibly secondary to dental issue though as patient notices that when she brushes her teeth when she wakes up in the morning, unclear from history but would be possible sleep secondary to PE though no prior history, patient also notes she is having diffuse bruising everywhere consider possible complicated by possible worsening cirrhosis thrombocytopenia, platelets/clotting disorder?  Less concern for GI bleed but would not eliminate from differential, given overall presentation we will check basic labs CT chest abdomen pelvis EKG type and screen, give meds as needed and reassess, dispo pending patient may require admission for further evaluation pending study results.

## 2024-07-12 NOTE — ED ADULT TRIAGE NOTE - CHIEF COMPLAINT QUOTE
spitting up blood with clots for the past 3 days. Intermittent lower abdominal pains, no pains at triage.  hx torn esophagus 13 years ago, ESRD on dialysis 3 x weekly.

## 2024-07-12 NOTE — ED PROVIDER NOTE - DATE/TIME 1
[phone]-patient called, wondering about coverage after the PA was done.  He is calling [employer] to ask if they have made their decision yet. Patient offered to do this and was satisfied with handling it himself  
Adderall XR was denied.  I am faxing the paperwork to RUFINO Frankel.    
Call to [employer] and spoke to representative reviewed case  Approval granted   Reference [id number]  Approved from 2/22/19 to 2/22/20    Writer called and left message for patient that medication has been approved amphetamine-dextroamphetamine (ADDERALL XR) 25 MG 24 hr capsule Take 1 capsule by mouth daily  Routed to provider as a FYI only no med change needed   
Date:  2/15/2019    Medication:  Adderall XR 25 mg capsules    Message to Prescriber:  I faxed PA form to Jostin [employer]YALA Company Name:  [employer] [facility]    PA Company Phone/Fax Number:  /    Pharmacy Name:  [facility]    Pharmacy Phone/Fax Number:  [phone]    Prior Auth (PA) form requested from insurance:  Yes  [x]    No  []     Demographics complete on Prior Auth (PA).  Yes  [x]    No  []     Prior Auth (PA) form placed in provider's (MD/NP) mailbox:  Yes  []    No  [x]     Date Prior Auth (PA) form placed in provider's (MD/NP) mailbox:      Date Prior Auth (PA) faxed to insurance company/pharmacy:          
[employer] PA form completed for amphetamine-dextroamphetamine (ADDERALL XR) 25 MG 24 hr capsule Take 1 capsule by mouth daily. Completed with covermymeds reference [id number]     Diagnosis:  f90.0 ADHD  How long on medication: 1/31/18--although been on the med since 8/2017  Other PDL medications tried:  adderall 10 mg 1/31/18 to 5/22/18, adderall 15 mg 10/14/17 to 11/3/17, dextroamphetamine 15 mg 10/17/18 to 1/4/18 all ineffective   
Patient left an angry voice mail.  Has been out of med for 3 days.  Please address asap  
Per fax received med not covered by plan. amphetamine-dextroamphetamine (ADDERALL XR) 25 MG 24 hr capsule Take 1 capsule by mouth daily    Message routed to provider to review/address  
12-Jul-2024 22:22

## 2024-07-12 NOTE — ED PROVIDER NOTE - PROGRESS NOTE DETAILS
Jacqueline PETERSON:  Jacqueline PETERSON: I discussed case with the hospitalist, see accepting physician. Will arrange for admission to their service.

## 2024-07-12 NOTE — ED ADULT NURSE NOTE - NSFALLHARMRISKINTERV_ED_ALL_ED

## 2024-07-13 ENCOUNTER — TRANSCRIPTION ENCOUNTER (OUTPATIENT)
Age: 64
End: 2024-07-13

## 2024-07-13 DIAGNOSIS — D69.6 THROMBOCYTOPENIA, UNSPECIFIED: ICD-10-CM

## 2024-07-13 DIAGNOSIS — I85.00 ESOPHAGEAL VARICES WITHOUT BLEEDING: ICD-10-CM

## 2024-07-13 DIAGNOSIS — K74.60 UNSPECIFIED CIRRHOSIS OF LIVER: ICD-10-CM

## 2024-07-13 DIAGNOSIS — R21 RASH AND OTHER NONSPECIFIC SKIN ERUPTION: ICD-10-CM

## 2024-07-13 DIAGNOSIS — N18.6 END STAGE RENAL DISEASE: ICD-10-CM

## 2024-07-13 DIAGNOSIS — E11.9 TYPE 2 DIABETES MELLITUS WITHOUT COMPLICATIONS: ICD-10-CM

## 2024-07-13 LAB
A1C WITH ESTIMATED AVERAGE GLUCOSE RESULT: 8.5 % — HIGH (ref 4–5.6)
ALBUMIN SERPL ELPH-MCNC: 2.7 G/DL — LOW (ref 3.3–5)
ALP SERPL-CCNC: 137 U/L — HIGH (ref 40–120)
ALT FLD-CCNC: 21 U/L — SIGNIFICANT CHANGE UP (ref 12–78)
ANION GAP SERPL CALC-SCNC: 8 MMOL/L — SIGNIFICANT CHANGE UP (ref 5–17)
AST SERPL-CCNC: 27 U/L — SIGNIFICANT CHANGE UP (ref 15–37)
BILIRUB SERPL-MCNC: 1 MG/DL — SIGNIFICANT CHANGE UP (ref 0.2–1.2)
BUN SERPL-MCNC: 34 MG/DL — HIGH (ref 7–23)
CALCIUM SERPL-MCNC: 9.8 MG/DL — SIGNIFICANT CHANGE UP (ref 8.5–10.1)
CHLORIDE SERPL-SCNC: 107 MMOL/L — SIGNIFICANT CHANGE UP (ref 96–108)
CHOLEST SERPL-MCNC: 95 MG/DL — SIGNIFICANT CHANGE UP
CO2 SERPL-SCNC: 26 MMOL/L — SIGNIFICANT CHANGE UP (ref 22–31)
CREAT SERPL-MCNC: 3.47 MG/DL — HIGH (ref 0.5–1.3)
EGFR: 14 ML/MIN/1.73M2 — LOW
ESTIMATED AVERAGE GLUCOSE: 197 MG/DL — HIGH (ref 68–114)
GLUCOSE BLDC GLUCOMTR-MCNC: 181 MG/DL — HIGH (ref 70–99)
GLUCOSE BLDC GLUCOMTR-MCNC: 206 MG/DL — HIGH (ref 70–99)
GLUCOSE BLDC GLUCOMTR-MCNC: 247 MG/DL — HIGH (ref 70–99)
GLUCOSE SERPL-MCNC: 194 MG/DL — HIGH (ref 70–99)
HCT VFR BLD CALC: 28.9 % — LOW (ref 34.5–45)
HDLC SERPL-MCNC: 43 MG/DL — LOW
HGB BLD-MCNC: 9.5 G/DL — LOW (ref 11.5–15.5)
LIPID PNL WITH DIRECT LDL SERPL: 34 MG/DL — SIGNIFICANT CHANGE UP
MCHC RBC-ENTMCNC: 32.9 G/DL — SIGNIFICANT CHANGE UP (ref 32–36)
MCHC RBC-ENTMCNC: 34.1 PG — HIGH (ref 27–34)
MCV RBC AUTO: 103.6 FL — HIGH (ref 80–100)
NON HDL CHOLESTEROL: 52 MG/DL — SIGNIFICANT CHANGE UP
NRBC # BLD: 0 /100 WBCS — SIGNIFICANT CHANGE UP (ref 0–0)
PLATELET # BLD AUTO: 37 K/UL — LOW (ref 150–400)
POTASSIUM SERPL-MCNC: 3.9 MMOL/L — SIGNIFICANT CHANGE UP (ref 3.5–5.3)
POTASSIUM SERPL-SCNC: 3.9 MMOL/L — SIGNIFICANT CHANGE UP (ref 3.5–5.3)
PROT SERPL-MCNC: 5.7 GM/DL — LOW (ref 6–8.3)
RBC # BLD: 2.79 M/UL — LOW (ref 3.8–5.2)
RBC # FLD: 14.7 % — HIGH (ref 10.3–14.5)
SODIUM SERPL-SCNC: 141 MMOL/L — SIGNIFICANT CHANGE UP (ref 135–145)
TRIGL SERPL-MCNC: 94 MG/DL — SIGNIFICANT CHANGE UP
WBC # BLD: 2.89 K/UL — LOW (ref 3.8–10.5)
WBC # FLD AUTO: 2.89 K/UL — LOW (ref 3.8–10.5)

## 2024-07-13 PROCEDURE — 99233 SBSQ HOSP IP/OBS HIGH 50: CPT

## 2024-07-13 RX ORDER — DIPHENHYDRAMINE HCL 12.5MG/5ML
25 ELIXIR ORAL EVERY 8 HOURS
Refills: 0 | Status: DISCONTINUED | OUTPATIENT
Start: 2024-07-13 | End: 2024-07-14

## 2024-07-13 RX ORDER — DIPHENHYDRAMINE HCL 12.5MG/5ML
25 ELIXIR ORAL ONCE
Refills: 0 | Status: COMPLETED | OUTPATIENT
Start: 2024-07-13 | End: 2024-07-13

## 2024-07-13 RX ORDER — RIFAXIMIN 550 MG/1
1 TABLET ORAL
Qty: 0 | Refills: 0 | DISCHARGE
Start: 2024-07-13

## 2024-07-13 RX ORDER — BACITRACIN 500 UNIT/G
1 OINTMENT (GRAM) TOPICAL
Refills: 0 | Status: DISCONTINUED | OUTPATIENT
Start: 2024-07-13 | End: 2024-07-14

## 2024-07-13 RX ORDER — BETAMETHASONE DIPROPIONATE 0.5 MG/G
1 CREAM TOPICAL
Refills: 0 | Status: DISCONTINUED | OUTPATIENT
Start: 2024-07-13 | End: 2024-07-14

## 2024-07-13 RX ORDER — OXYCODONE HYDROCHLORIDE 100 MG/5ML
5 SOLUTION ORAL ONCE
Refills: 0 | Status: DISCONTINUED | OUTPATIENT
Start: 2024-07-13 | End: 2024-07-13

## 2024-07-13 RX ORDER — DIPHENHYDRAMINE HYDROCHLORIDE AND ZINC ACETATE 10; 1 MG/G; MG/G
1 CREAM TOPICAL ONCE
Refills: 0 | Status: COMPLETED | OUTPATIENT
Start: 2024-07-13 | End: 2024-07-14

## 2024-07-13 RX ORDER — PANTOPRAZOLE SODIUM 40 MG/10ML
40 INJECTION, POWDER, FOR SOLUTION INTRAVENOUS EVERY 12 HOURS
Refills: 0 | Status: DISCONTINUED | OUTPATIENT
Start: 2024-07-13 | End: 2024-07-14

## 2024-07-13 RX ADMIN — OXYCODONE HYDROCHLORIDE 5 MILLIGRAM(S): 100 SOLUTION ORAL at 13:55

## 2024-07-13 RX ADMIN — INSULIN LISPRO 4: 100 INJECTION, SOLUTION SUBCUTANEOUS at 08:02

## 2024-07-13 RX ADMIN — INSULIN LISPRO 4: 100 INJECTION, SOLUTION SUBCUTANEOUS at 12:04

## 2024-07-13 RX ADMIN — Medication 25 MILLIGRAM(S): at 00:56

## 2024-07-13 RX ADMIN — Medication 25 MILLIGRAM(S): at 13:51

## 2024-07-13 RX ADMIN — Medication 25 MILLIGRAM(S): at 19:15

## 2024-07-13 RX ADMIN — Medication 3 MILLIGRAM(S): at 00:56

## 2024-07-13 RX ADMIN — PANTOPRAZOLE SODIUM 40 MILLIGRAM(S): 40 INJECTION, POWDER, FOR SOLUTION INTRAVENOUS at 05:29

## 2024-07-13 RX ADMIN — PANTOPRAZOLE SODIUM 40 MILLIGRAM(S): 40 INJECTION, POWDER, FOR SOLUTION INTRAVENOUS at 00:56

## 2024-07-13 NOTE — DISCHARGE NOTE PROVIDER - NSDCMRMEDTOKEN_GEN_ALL_CORE_FT
gabapentin 100 mg oral capsule: 1 cap(s) orally 3 times a day  Imdur 30 mg oral tablet, extended release: 1 tab(s) orally once a day (in the morning)  Lantus Solostar Pen 100 units/mL subcutaneous solution: unit(s) subcutaneous once a day (at bedtime)  NovoLOG FlexPen 100 units/mL subcutaneous solution: unit(s) subcutaneous 3 times a day  rifAXIMin 550 mg oral tablet: 1 tab(s) orally 2 times a day  Synthroid 75 mcg (0.075 mg) oral tablet: 1 tab(s) orally once a day

## 2024-07-13 NOTE — CONSULT NOTE ADULT - SUBJECTIVE AND OBJECTIVE BOX
Patient chart reviewed, full consult to follow.     Will arrange for HD today    Thank you for the courtesy of this consultation.   [facility] NEPHROLOGY SERVICES, [facility]  NEPHROLOGY AND HYPERTENSION  [address]  [address]  [place] [address]  [phone]    MD MILTON LAURENT MD YELENA ROSENBERG, MD BINNY KOSHY, MD CHRISTOPHER CAPUTO, MD EDWARD BOVER, MD      Information from chart:  "Patient is a 63y old  Female who presents with a chief complaint of Bleeding in the Mouth with Thrombocytopenia (2024 13:23)    HPI:  63 year old male with a PMH of ESRD(HD-T,Th,S), DM, esophageal perforation (13 years ago), Cirrhosis, Leukemia presents to the ED to be evaluated for “coughing up blood” as per ED provider. Upon evaluation, patient endorses, she has not been coughing blood, nor vomiting blood, but for the past 3 days she has been finding blood on her pillow, & dry blood around her mouth when she wakes up every morning. Upon inspection of her mouth, patient appears to have a injury on the hard palate, patient also has thrombocytopenia with a platelet of 42 which could be contributory to the blood finding in her pillow upon waken up. Denies any bleeding when she is awake. Endorse mild throat discomfort, abdominal discomfort, and increase in fluid which she needed to have an extra dialysis session for this week. Denies SOB, cough, chest pain, palpitation, diarrhea, nausea, vomiting or any other symptoms. Labs remarkable for H/H:10.1/30.7, MCV: 103.7, Platelet 42, BUN/Cr:30/3.2, Alkp/AST: 152/50. Vitals stable. Afebrile.   CTA-Chest: No pulmonary embolism. Small right pleural effusion with associated atelectasis.  CT-Abd/P: Hepatomegaly and nodular contour to the liver consistent with cirrhosis. Splenomegaly. Prominent esophageal varices and alonso splenic varices consistent with portal hypertension.   (2024 06:02)   "    PAST MEDICAL & SURGICAL HISTORY:  HTN (hypertension)      Diabetes      Water retention      Hypothyroid      Morbid obesity      History of cholecystectomy      History of appendectomy      H/O:   x3      Abdominal hernia  x 3 with abdominal mesh        FAMILY HISTORY:    Allergies    penicillin (Hives)    Intolerances    NSAIDs (Other; Headache; Nausea)    Home Medications:  gabapentin 100 mg oral capsule: 1 cap(s) orally 3 times a day (07 Dec 2023 21:16)  Imdur 30 mg oral tablet, extended release: 1 tab(s) orally once a day (in the morning) (07 Dec 2023 21:16)  Lantus Solostar Pen 100 units/mL subcutaneous solution: unit(s) subcutaneous once a day (at bedtime) (07 Dec 2023 21:16)  NovoLOG FlexPen 100 units/mL subcutaneous solution: unit(s) subcutaneous 3 times a day (07 Dec 2023 21:16)  rifAXIMin 550 mg oral tablet: 1 tab(s) orally 2 times a day (2024 13:32)  Synthroid 75 mcg (0.075 mg) oral tablet: 1 tab(s) orally once a day (2017 23:19)    MEDICATIONS  (STANDING):  bacitracin   Ointment 1 Application(s) Topical two times a day  betamethasone valerate 0.1% Cream 1 Application(s) Topical two times a day  dextrose 5%. 1000 milliLiter(s) (50 mL/Hr) IV Continuous <Continuous>  dextrose 50% Injectable 25 Gram(s) IV Push once  glucagon  Injectable 1 milliGRAM(s) IntraMuscular once  insulin lispro (ADMELOG) corrective regimen sliding scale   SubCutaneous three times a day before meals  pantoprazole    Tablet 40 milliGRAM(s) Oral every 12 hours  rifAXIMin 550 milliGRAM(s) Oral two times a day    MEDICATIONS  (PRN):  acetaminophen     Tablet .. 650 milliGRAM(s) Oral every 6 hours PRN Temp greater or equal to 38C (100.4F), Mild Pain (1 - 3)  aluminum hydroxide/magnesium hydroxide/simethicone Suspension 30 milliLiter(s) Oral every 4 hours PRN Dyspepsia  dextrose Oral Gel 15 Gram(s) Oral once PRN Blood Glucose LESS THAN 70 milliGRAM(s)/deciliter  diphenhydrAMINE 25 milliGRAM(s) Oral every 8 hours PRN Rash and/or Itching  melatonin 3 milliGRAM(s) Oral at bedtime PRN Insomnia  ondansetron Injectable 4 milliGRAM(s) IV Push every 8 hours PRN Nausea and/or Vomiting    Vital Signs Last 24 Hrs  T(C): 36.7 (2024 11:02), Max: 36.7 (2024 23:48)  T(F): 98 (2024 11:02), Max: 98 (2024 23:48)  HR: 58 (2024 11:02) (58 - 66)  BP: 152/67 (2024 11:02) (107/77 - 164/77)  BP(mean): --  RR: 18 (2024 11:02) (18 - 20)  SpO2: 94% (:) (94% - 99%)    Parameters below as of 2024 11:  Patient On (Oxygen Delivery Method): room air        Daily Height in cm: 162.56 (2024 00:27)    Daily     CAPILLARY BLOOD GLUCOSE      POCT Blood Glucose.: 247 mg/dL (2024 11:40)  POCT Blood Glucose.: 206 mg/dL (2024 07:53)    PHYSICAL EXAM:      T(C): 36.7 (24 @ 11:02), Max: 36.7 (24 @ 23:48)  HR: 58 (24 @ 11:02) (58 - 66)  BP: 152/67 (24 @ 11:02) (107/77 - 164/77)  RR: 18 (24 @ 11:02) (18 - 20)  SpO2: 94% (24 @ 11:02) (94% - 99%)  Wt(kg): --  Lungs clear  Heart S1S2  Abd soft NT ND  Extremities:   tr edema                  141  |  107  |  34<H>  ----------------------------<  194<H>  3.9   |  26  |  3.47<H>    Ca    9.8      2024 08:00    TPro  5.7<L>  /  Alb  2.7<L>  /  TBili  1.0  /  DBili  x   /  AST  27  /  ALT  21  /  AlkPhos  137<H>                            9.5    2.89  )-----------( 37       ( 2024 08:00 )             28.9     Creatinine Trend: 3.47<--, 3.20<--  Urinalysis Basic - ( 2024 08:00 )    Color: x / Appearance: x / SG: x / pH: x  Gluc: 194 mg/dL / Ketone: x  / Bili: x / Urobili: x   Blood: x / Protein: x / Nitrite: x   Leuk Esterase: x / RBC: x / WBC x   Sq Epi: x / Non Sq Epi: x / Bacteria: x            Assessment   ESRD, maintenance  Thrombocytopenia with suspected oral source of bleeding     Plan  HD for today  UF as tolerated   Discharge planning     Filippo Smith MD

## 2024-07-13 NOTE — H&P ADULT - PROBLEM SELECTOR PLAN 1
History of Leukemia with a Platelet 42   3 day history of waking up with blood on pillow.  Multiple bruises & petechia throughout the body  - Monitor for bleeding   - Consider platelet transfusion if bleeding continues History of Leukemia with a Platelet 42   3 day history of waking up with blood on pillow.  Multiple bruises & petechia throughout the body  - Monitor for bleeding   - Consider platelet transfusion if bleeding continues  -

## 2024-07-13 NOTE — H&P ADULT - ASSESSMENT
63 year old male with a PMH of ESRD(HD-T,Th,S), DM, esophageal perforation (13 years ago), Cirrhosis, Leukemia presents to the ED to be evaluated for “coughing up blood” as per ED provider. Upon evaluation, patient endorses, she has not been coughing blood, nor vomiting blood, but for the past 3 days she has been finding blood on her pillow, & dry blood around her mouth when she wakes up every morning. Upon inspection of her mouth, patient appears to have a injury on the hard palate, patient also has thrombocytopenia with a platelet of 42 which could be contributory to the blood finding in her pillow upon waken up. Denies any bleeding when she is awake. Endorse mild throat discomfort, abdominal discomfort, and increase in fluid which she needed to have an extra dialysis session for this week. Denies SOB, cough, chest pain, palpitation, diarrhea, nausea, vomiting or any other symptoms. Labs remarkable for H/H:10.1/30.7, MCV: 103.7, Platelet 42, BUN/Cr:30/3.2, Alkp/AST: 152/50. Vitals stable. Afebrile.   CTA-Chest: No pulmonary embolism. Small right pleural effusion with associated atelectasis.     63 year old male with a PMH of ESRD(HD-T,Th,S), DM, esophageal perforation (13 years ago), Cirrhosis, Leukemia presents to the ED to be evaluated for “coughing up blood” as per ED provider. Upon evaluation, patient endorses, she has not been coughing blood, nor vomiting blood, but for the past 3 days she has been finding blood on her pillow, & dry blood around her mouth when she wakes up every morning. Upon inspection of her mouth, patient appears to have a injury on the hard palate, patient also has thrombocytopenia with a platelet of 42 which could be contributory to the blood finding in her pillow upon waken up. Denies any bleeding when she is awake. Endorse mild throat discomfort, abdominal discomfort, and increase in fluid which she needed to have an extra dialysis session for this week. Denies SOB, cough, chest pain, palpitation, diarrhea, nausea, vomiting or any other symptoms. Labs remarkable for H/H:10.1/30.7, MCV: 103.7, Platelet 42, BUN/Cr:30/3.2, Alkp/AST: 152/50. Vitals stable. Afebrile.

## 2024-07-13 NOTE — H&P ADULT - HISTORY OF PRESENT ILLNESS
63 year old male with a PMH of ESRD(HD-T,Th,S), DM, esophageal perforation (13 years ago), Cirrhosis, Leukemia presents to the ED to be evaluated for “coughing up blood” as per ED provider. Upon evaluation, patient endorses, she has not been coughing blood, nor vomiting blood, but for the past 3 days she has been finding blood on her pillow, & dry blood around her mouth when she wakes up every morning. Upon inspection of her mouth, patient appears to have a injury on the hard palate, patient also has thrombocytopenia with a platelet of 42 which could be contributory to the blood finding in her pillow upon waken up. Denies any bleeding when she is awake. Endorse mild throat discomfort, abdominal discomfort, and increase in fluid which she needed to have an extra dialysis session for this week. Denies SOB, cough, chest pain, palpitation, diarrhea, nausea, vomiting or any other symptoms. Labs remarkable for H/H:10.1/30.7, MCV: 103.7, Platelet 42, BUN/Cr:30/3.2, Alkp/AST: 152/50. Vitals stable. Afebrile.   CTA-Chest: No pulmonary embolism. Small right pleural effusion with associated atelectasis.  CT-Abd/P: Hepatomegaly and nodular contour to the liver consistent with cirrhosis. Splenomegaly. Prominent esophageal varices and alonso splenic varices consistent with portal hypertension.

## 2024-07-13 NOTE — H&P ADULT - NSHPPHYSICALEXAM_GEN_ALL_CORE
GENERAL: NAD, comfortable at bedside   HEAD:  Atraumatic, Normocephalic  Mouth: Hard palate with one small injury. Missing teeth, with multiple decaying teeth    EYES: EOMI, PERRL, conjunctiva and sclera clear  NECK: Supple, No JVD  Chest:: Clear to auscultation bilaterally; No wheezes, rales or rhonchi. Dialysis port on the right side of the chest  HEART: Regular rate and rhythm; No murmurs, rubs, or gallops, (+)S1, S2  ABDOMEN: +Soft, Nontender, Obese abdomen   EXTREMITIES:   No clubbing, cyanosis, B/L LE edema. RUE fistula (not yet being used)   PSYCH: normal mood and affect  NEUROLOGY: AAOx3, non-focal   SKIN: Pruritic rash on the right side of the chest by the port extended to the breast (reaction from the tape used to hold the port). Multiple area of petechiae & bruises.

## 2024-07-13 NOTE — DISCHARGE NOTE PROVIDER - HOSPITAL COURSE
63 year old male with a PMH of ESRD(HD-T,Th,S), DM, esophageal perforation (13 years ago), Cirrhosis, Leukemia presents to the ED to be evaluated for “coughing up blood” as per ED provider. Upon evaluation, patient endorses, she has not been coughing blood, nor vomiting blood, but for the past 3 days she has been finding blood on her pillow, & dry blood around her mouth when she wakes up every morning. Upon inspection of her mouth, patient appears to have a injury on the hard palate, patient also has thrombocytopenia with a platelet of 42 which could be contributory to the blood finding in her pillow upon waken up. Denies any bleeding when she is awake. Endorse mild throat discomfort, abdominal discomfort, and increase in fluid which she needed to have an extra dialysis session for this week. Denies SOB, cough, chest pain, palpitation, diarrhea, nausea, vomiting or any other symptoms. Labs remarkable for H/H:10.1/30.7, MCV: 103.7, Platelet 42, BUN/Cr:30/3.2, Alkp/AST: 152/50. Vitals stable. Afebrile.              Problem/Plan - 1:  ·  Problem: Thrombocytopenia.   ·  Plan: History of Leukemia with a Platelet 42   3 day history of waking up with blood on pillow, but has now resolved   Multiple bruises & petechia throughout the body  needs close follow up with her pcp to repeat labs      Problem/Plan - 2:  ·  Problem: Cirrhosis.  ·  Plan: CT-Abd/P: Hepatomegaly and nodular contour to the liver consistent with cirrhosis. Splenomegaly. Prominent esophageal varices and alonso splenic varices consistent with portal hypertension.  - Rifaximin 550mg BID   - PPI   - outpatient follow up with her GI.      Problem/Plan - 3:  ·  Problem: Diabetes mellitus.       Problem/Plan - 4:  ·  Problem: ESRD on dialysis.  ·  Plan: HD-T.Th, Sat       Problem/Plan - 5:  ·  Problem: Rash.  ·  Plan: Puritic rash on the right chest extended to the breast   - Avoid plastic tape to the skin   - Benadryl PRN   - Ointment/bacitracin   - Monitor.

## 2024-07-13 NOTE — H&P ADULT - NSHPLABSRESULTS_GEN_ALL_CORE
10.1   3.04  )-----------( 42       ( 12 Jul 2024 17:10 )             30.7     140  |  107  |  30<H>  ----------------------------<  237<H>     07-12  4.3   |  28  |  3.20<H>    Ca    9.7      12 Jul 2024 17:10    TPro  6.4  /  Alb  2.8<L>  /  TBili  1.0  /  DBili  x   /  AST  50<H>  /  ALT  26  /  AlkPhos  152<H>  07-12        CHEST:  LUNGS AND LARGE AIRWAYS: Patent central airways.  Right basilar atelectasis. PLEURA: Small right pleural effusion.  VESSELS: No pulmonary embolism.  HEART: Cardiomegaly. Pacer leads. Mitral valve annulus calcification. No pericardial effusion.  MEDIASTINUM AND MAGDALENE: No lymphadenopathy.  CHEST WALL AND LOWER NECK: Within normal limits.    ABDOMEN AND PELVIS:  LIVER: Hepatomegaly and nodular contour to the liver consistent with cirrhosis.  BILE DUCTS: Normal caliber.  GALLBLADDER: Within normal limits.  SPLEEN: Splenomegaly.  PANCREAS: Within normal limits.  ADRENALS: Within normal limits.  KIDNEYS/URETERS: Small cysts and renal hypodensities too small to characterize.    BLADDER: Within normal limits.  REPRODUCTIVE ORGANS: Prominent cystic changes as well as small droplets of air in the uterine endometrium. Recommend pelvic ultrasound for further evaluation.    BOWEL: Small hiatal hernia. No bowel obstruction. Appendix is not visualized.  PERITONEUM/RETROPERITONEUM: Within normal limits.  VESSELS: Prominent esophageal varices and perisplenic varices consistent with portal hypertension.  LYMPH NODES: No lymphadenopathy.  ABDOMINAL WALL: Anterior abdominal wall surgical mesh. Mild anasarca.  BONES: Degenerative changes.

## 2024-07-13 NOTE — PROVIDER CONTACT NOTE (OTHER) - BACKGROUND
offered creams, benadryl cream; however upon return to pt's bedside with medication; pt is sleeping. noted w/ rise and fall of chest. respiratory rate WDL.
remains adamantly refusing IV access despite education.

## 2024-07-13 NOTE — H&P ADULT - PROBLEM SELECTOR PLAN 2
CT-Abd/P: Hepatomegaly and nodular contour to the liver consistent with cirrhosis. Splenomegaly. Prominent esophageal varices and alonso splenic varices consistent with portal hypertension.  - Rifaximin 550mg BID   - PPI   - Possible GI consult   - Monitor

## 2024-07-13 NOTE — H&P ADULT - PROBLEM SELECTOR PLAN 4
ISS with hypoglycemia   - F/U A1c HD-T.Th, Sat  Due to fluid overload had HD T,W,Th this week   Needs to be dialyzed today as schedule

## 2024-07-13 NOTE — PROVIDER CONTACT NOTE (OTHER) - SITUATION
patient refusing discharge tonight; stating that she wants to be discharge tomorrow morning instead. pt states that she has a home health aide that will pick her up tomorrow.
post HD treatment today. pt states that she usually gets generalized itching post dialysis. benadryl PO given. not due at this time; however pt remains complaining to itching.
US guided IV access in the ED. however when patient came to unit 1E. IV catheter out. patient adamantly refusing IV access. patient is alert/oriented. educated on importance of IV access.

## 2024-07-13 NOTE — DISCHARGE NOTE NURSING/CASE MANAGEMENT/SOCIAL WORK - PATIENT PORTAL LINK FT
You can access the FollowMyHealth Patient Portal offered by [facility] by registering at the following website: [email or web address]. By joining [email or web address]’s FollowMyHealth portal, you will also be able to view your health information using other applications (apps) compatible with our system.

## 2024-07-13 NOTE — H&P ADULT - PROBLEM SELECTOR PLAN 3
CT-Abd/P: Hepatomegaly and nodular contour to the liver consistent with cirrhosis. Splenomegaly. Prominent esophageal varices and alonso splenic varices consistent with portal hypertension. ISS with hypoglycemia   - F/U A1c

## 2024-07-13 NOTE — PATIENT PROFILE ADULT - SURGICAL SITE INCISION
no Emotional support was provided to pt. and family. Psychological preparation for procedure was provided through pictures and medical materials./recreational activity provided

## 2024-07-13 NOTE — DISCHARGE NOTE PROVIDER - NSDCCPCAREPLAN_GEN_ALL_CORE_FT
PRINCIPAL DISCHARGE DIAGNOSIS  Diagnosis: Coughing up blood  Assessment and Plan of Treatment: follow up with your PCP and gastroenterologist. Last platelete count was 37 k and should be repeated

## 2024-07-13 NOTE — DISCHARGE NOTE NURSING/CASE MANAGEMENT/SOCIAL WORK - NSDCPEFALRISK_GEN_ALL_CORE
For information on Fall & Injury Prevention, visit: [email or web address] OR  [email or web address] OR  https://www.cdc.gov/steadi/patient.html

## 2024-07-13 NOTE — PATIENT PROFILE ADULT - FALL HARM RISK - HARM RISK INTERVENTIONS

## 2024-07-13 NOTE — H&P ADULT - PROBLEM SELECTOR PLAN 5
HD-T.Th, Sat  Due to fluid overload had HD T,W,Th this week   Needs to be dialyzed today as schedule Puritic rash on the right chest extended to the breast   - Avoid plastic tape to the skin   - Benadryl PRN   - Ointment/bacitracin   - Monitor

## 2024-07-14 VITALS
RESPIRATION RATE: 18 BRPM | SYSTOLIC BLOOD PRESSURE: 126 MMHG | HEART RATE: 65 BPM | DIASTOLIC BLOOD PRESSURE: 66 MMHG | OXYGEN SATURATION: 99 % | TEMPERATURE: 97 F

## 2024-07-14 LAB
GLUCOSE BLDC GLUCOMTR-MCNC: 196 MG/DL — HIGH (ref 70–99)
GLUCOSE BLDC GLUCOMTR-MCNC: 245 MG/DL — HIGH (ref 70–99)

## 2024-07-14 PROCEDURE — 99239 HOSP IP/OBS DSCHRG MGMT >30: CPT

## 2024-07-14 RX ADMIN — BETAMETHASONE DIPROPIONATE 1 APPLICATION(S): 0.5 CREAM TOPICAL at 05:38

## 2024-07-14 RX ADMIN — Medication 25 MILLIGRAM(S): at 06:09

## 2024-07-14 RX ADMIN — INSULIN LISPRO 4: 100 INJECTION, SOLUTION SUBCUTANEOUS at 12:24

## 2024-07-14 RX ADMIN — Medication 30 MILLILITER(S): at 02:43

## 2024-07-14 RX ADMIN — DIPHENHYDRAMINE HYDROCHLORIDE AND ZINC ACETATE 1 APPLICATION(S): 10; 1 CREAM TOPICAL at 05:38

## 2024-07-14 RX ADMIN — INSULIN LISPRO 2: 100 INJECTION, SOLUTION SUBCUTANEOUS at 08:28

## 2024-07-14 RX ADMIN — Medication 1 APPLICATION(S): at 05:39

## 2024-07-14 RX ADMIN — PANTOPRAZOLE SODIUM 40 MILLIGRAM(S): 40 INJECTION, POWDER, FOR SOLUTION INTRAVENOUS at 05:39

## 2024-07-22 DIAGNOSIS — D69.6 THROMBOCYTOPENIA, UNSPECIFIED: ICD-10-CM

## 2024-07-22 DIAGNOSIS — R21 RASH AND OTHER NONSPECIFIC SKIN ERUPTION: ICD-10-CM

## 2024-07-22 DIAGNOSIS — S00.502A UNSPECIFIED SUPERFICIAL INJURY OF ORAL CAVITY, INITIAL ENCOUNTER: ICD-10-CM

## 2024-07-22 DIAGNOSIS — K74.60 UNSPECIFIED CIRRHOSIS OF LIVER: ICD-10-CM

## 2024-07-22 DIAGNOSIS — N18.6 END STAGE RENAL DISEASE: ICD-10-CM

## 2024-07-22 DIAGNOSIS — K13.79 OTHER LESIONS OF ORAL MUCOSA: ICD-10-CM

## 2024-07-22 DIAGNOSIS — Z88.0 ALLERGY STATUS TO PENICILLIN: ICD-10-CM
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
## 2024-07-22 DIAGNOSIS — Z85.6 PERSONAL HISTORY OF LEUKEMIA: ICD-10-CM

## 2024-07-22 DIAGNOSIS — E11.649 TYPE 2 DIABETES MELLITUS WITH HYPOGLYCEMIA WITHOUT COMA: ICD-10-CM

## 2024-07-22 DIAGNOSIS — Z79.890 HORMONE REPLACEMENT THERAPY: ICD-10-CM

## 2024-07-22 DIAGNOSIS — I85.10 SECONDARY ESOPHAGEAL VARICES WITHOUT BLEEDING: ICD-10-CM

## 2024-07-22 DIAGNOSIS — E11.22 TYPE 2 DIABETES MELLITUS WITH DIABETIC CHRONIC KIDNEY DISEASE: ICD-10-CM

## 2024-07-22 DIAGNOSIS — Z79.4 LONG TERM (CURRENT) USE OF INSULIN: ICD-10-CM
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
## 2024-07-22 DIAGNOSIS — K76.6 PORTAL HYPERTENSION: ICD-10-CM

## 2024-07-22 DIAGNOSIS — E03.9 HYPOTHYROIDISM, UNSPECIFIED: ICD-10-CM

## 2024-08-29 NOTE — PROVIDER CONTACT NOTE (OTHER) - DATE AND TIME:
Behavioral Health IP Nursing Progress Note    Suicidal Ideation:  denies    Current C-SSRS score: Negative Screen - White (08/29/24 0800)      Protective Factors / Reason for Living: Compliance with medications    Interventions:   Q15 minute safety checks  1:1 assessment with RN    Subjective: Patient reported feeling safe and denied current SH, SI, thoughts to be dead and HI. Pt denied current pain. Pt denied AVH. Pt denied any magical/delusional/paranoid thoughts. Pt reported increased frustration this AM d/t noise in the lounge but reported taking breaks helps. Pt spent time talking about his ja and switching to Shinto 4 years ago. He proceeded to discuss this but was circumstantial with his thought process. Pt talked about how he feels \"much better\" than when admitted but unable to explain why. He apologized for \"my disruptive and disrespectful behavior\". Pt reported feeling \"just stressed but I will make it\". Pt reported \"pretty good\" sleep.     As the shift progressed, pt discussed situations where he sent 30-79 letters with only one word per envelope. Claiming he sent these to his landlord, a woman that he started dating, and the president. He did became a bit frustrated with some peers as the shift progressed but able to be redirected and calmed. Enjoyed going outside with peers and went to group    Objective: Pt appeared labile, intermittently irritable, angry at times, intrusive intermittently, circumstantial, paranoid, and delusional. Exhibited rapid speech at times, appearing to have overabundance and racing ideas intermittently. Pt appeared to be minimally attending to ADLs. Pt ate well. Pt compliant with medications with no prompting. Pt observed in the milieu. Pt was intermittently interactive with peers and staff.      Medical:   Elevated BP- clonidine given and BP normalized. Pulse remained elevated. Writer spoke to medical provider with pt and new medications ordered.      Assessment / Actions: 
13-Jul-2024 21:24
1:1 with RN, medication education and administration, encourage lounge and group attendance, monitor ADLs/nutrition/side effects, provide redirection and space as needed. MD approved pt to have personal bible. Ordered entered.     PRN Medications given? PRN clonidine given at 1200 for elevated BP    Plan:   Treatment Plan reviewed and updated.            
13-Jul-2024 00:45
13-Jul-2024 23:55